# Patient Record
Sex: FEMALE | Race: BLACK OR AFRICAN AMERICAN | NOT HISPANIC OR LATINO | Employment: FULL TIME | URBAN - METROPOLITAN AREA
[De-identification: names, ages, dates, MRNs, and addresses within clinical notes are randomized per-mention and may not be internally consistent; named-entity substitution may affect disease eponyms.]

---

## 2019-12-20 PROBLEM — M62.838 MUSCLE SPASM: Status: ACTIVE | Noted: 2019-12-20

## 2019-12-20 PROBLEM — G89.29 CHRONIC LEFT-SIDED LOW BACK PAIN WITHOUT SCIATICA: Status: ACTIVE | Noted: 2019-12-20

## 2019-12-20 PROBLEM — S00.12XA CONTUSION OF LEFT PERIOCULAR REGION: Status: ACTIVE | Noted: 2019-12-20

## 2019-12-20 PROBLEM — M54.50 CHRONIC LEFT-SIDED LOW BACK PAIN WITHOUT SCIATICA: Status: ACTIVE | Noted: 2019-12-20

## 2019-12-20 PROBLEM — M62.838 MUSCLE SPASM: Status: RESOLVED | Noted: 2019-12-20 | Resolved: 2019-12-20

## 2020-06-25 ENCOUNTER — NURSE TRIAGE (OUTPATIENT)
Dept: ADMINISTRATIVE | Facility: CLINIC | Age: 40
End: 2020-06-25

## 2020-06-25 NOTE — TELEPHONE ENCOUNTER
Pt has no symptoms. She was exposed to someone and was told by work to take a test before she could return. She has not received results and the test was done last week. She was provided this number. RN explained if she had testing done at [facility] testing sites, then the lab would usually show up in the system, but RN did not see. RN provided the [facility] results line chalo case. Pt will call this number. Advised to call back with further questions.

## 2020-06-25 NOTE — TELEPHONE ENCOUNTER
Reason for Disposition   COVID-19 Testing, questions about    Protocols used: CORONAVIRUS (COVID-19) DIAGNOSED OR [id number]

## 2023-07-10 PROBLEM — M54.50 CHRONIC LEFT-SIDED LOW BACK PAIN WITHOUT SCIATICA: Chronic | Status: ACTIVE | Noted: 2019-12-20

## 2023-07-10 PROBLEM — S00.12XA CONTUSION OF LEFT PERIOCULAR REGION: Status: RESOLVED | Noted: 2019-12-20 | Resolved: 2023-07-10

## 2023-07-10 PROBLEM — G89.29 CHRONIC LEFT-SIDED LOW BACK PAIN WITHOUT SCIATICA: Chronic | Status: ACTIVE | Noted: 2019-12-20

## 2025-04-23 ENCOUNTER — CLINICAL SUPPORT (OUTPATIENT)
Dept: OTHER | Facility: CLINIC | Age: 45
End: 2025-04-23

## 2025-04-23 DIAGNOSIS — Z00.8 ENCOUNTER FOR OTHER GENERAL EXAMINATION: ICD-10-CM

## 2025-04-24 VITALS
BODY MASS INDEX: 30.72 KG/M2 | WEIGHT: 184.38 LBS | HEIGHT: 65 IN | SYSTOLIC BLOOD PRESSURE: 128 MMHG | DIASTOLIC BLOOD PRESSURE: 76 MMHG

## 2025-04-24 LAB
GLUCOSE SERPL-MCNC: 103 MG/DL (ref 60–140)
HDLC SERPL-MCNC: 71 MG/DL
POC CHOLESTEROL, LDL (DOCK): 84 MG/DL
POC CHOLESTEROL, TOTAL: 186 MG/DL
TRIGL SERPL-MCNC: 188 MG/DL

## 2025-04-28 ENCOUNTER — RESULTS FOLLOW-UP (OUTPATIENT)
Dept: OTHER | Facility: CLINIC | Age: 45
End: 2025-04-28